# Patient Record
Sex: MALE
[De-identification: names, ages, dates, MRNs, and addresses within clinical notes are randomized per-mention and may not be internally consistent; named-entity substitution may affect disease eponyms.]

---

## 2021-01-13 PROBLEM — Z00.00 ENCOUNTER FOR PREVENTIVE HEALTH EXAMINATION: Status: ACTIVE | Noted: 2021-01-13

## 2021-04-28 ENCOUNTER — APPOINTMENT (OUTPATIENT)
Dept: PULMONOLOGY | Facility: CLINIC | Age: 46
End: 2021-04-28

## 2022-06-21 ENCOUNTER — APPOINTMENT (OUTPATIENT)
Dept: PULMONOLOGY | Facility: CLINIC | Age: 47
End: 2022-06-21
Payer: COMMERCIAL

## 2022-06-21 VITALS
HEIGHT: 67 IN | OXYGEN SATURATION: 95 % | BODY MASS INDEX: 44.1 KG/M2 | WEIGHT: 281 LBS | DIASTOLIC BLOOD PRESSURE: 82 MMHG | TEMPERATURE: 98 F | SYSTOLIC BLOOD PRESSURE: 114 MMHG | HEART RATE: 81 BPM

## 2022-06-21 DIAGNOSIS — R06.00 DYSPNEA, UNSPECIFIED: ICD-10-CM

## 2022-06-21 DIAGNOSIS — Z78.9 OTHER SPECIFIED HEALTH STATUS: ICD-10-CM

## 2022-06-21 DIAGNOSIS — Z87.19 PERSONAL HISTORY OF OTHER DISEASES OF THE DIGESTIVE SYSTEM: ICD-10-CM

## 2022-06-21 DIAGNOSIS — Z86.39 PERSONAL HISTORY OF OTHER ENDOCRINE, NUTRITIONAL AND METABOLIC DISEASE: ICD-10-CM

## 2022-06-21 PROCEDURE — 99203 OFFICE O/P NEW LOW 30 MIN: CPT

## 2022-06-22 PROBLEM — Z87.19 HISTORY OF GASTROESOPHAGEAL REFLUX (GERD): Status: RESOLVED | Noted: 2022-06-22 | Resolved: 2022-06-22

## 2022-06-22 PROBLEM — Z86.39 HISTORY OF TYPE 2 DIABETES MELLITUS: Status: RESOLVED | Noted: 2022-06-22 | Resolved: 2022-06-22

## 2022-06-22 PROBLEM — R06.00 DYSPNEA: Status: ACTIVE | Noted: 2022-06-22

## 2022-06-22 PROBLEM — Z78.9 NON-SMOKER: Status: ACTIVE | Noted: 2022-06-22

## 2022-06-22 RX ORDER — CHOLECALCIFEROL (VITAMIN D3) 1250 MCG
1.25 MG CAPSULE ORAL
Qty: 4 | Refills: 0 | Status: ACTIVE | COMMUNITY
Start: 2021-10-19

## 2022-06-22 RX ORDER — METFORMIN ER 500 MG 500 MG/1
500 TABLET ORAL
Qty: 60 | Refills: 0 | Status: ACTIVE | COMMUNITY
Start: 2022-02-21

## 2022-06-22 RX ORDER — OMEPRAZOLE 40 MG/1
40 CAPSULE, DELAYED RELEASE ORAL
Qty: 30 | Refills: 0 | Status: ACTIVE | COMMUNITY
Start: 2022-04-11

## 2022-06-22 NOTE — DISCUSSION/SUMMARY
[FreeTextEntry1] : 46 yo male with abnormal chest xray. I reviewed the images on line and discussed the findings with the patient and his mother who was present. He is to ha chest CT tomorrow, as ordered by his PMD. I will follow the results. He is to use albuterol MDI PRN  alone for now. PFT will be performed in the future. He is to follow up with his PMD as before.

## 2022-06-22 NOTE — HISTORY OF PRESENT ILLNESS
[Never] : never [TextBox_4] : 46 yo male with hx of abnormal chest xray, presents for evaluation. The study was performed because of dry cough  with PRN "problem breathing". He denies chest pain, night sweats, weight loss or hemoptysis. He was treated ICS/LABA and albuterol MDI in the past. He has GERD, on PPI and has seasonal allergies on PRN OTC treatment. [TextBox_29] : Denies snoring, daytime somnolence, apneic episodes, AM headaches

## 2022-06-22 NOTE — REVIEW OF SYSTEMS
[Cough] : cough [Sputum] : no sputum [Dyspnea] : dyspnea [GERD] : gerd [Diabetes] : diabetes [Negative] : Psychiatric

## 2022-07-12 ENCOUNTER — APPOINTMENT (OUTPATIENT)
Dept: PULMONOLOGY | Facility: CLINIC | Age: 47
End: 2022-07-12

## 2022-07-12 VITALS
HEART RATE: 95 BPM | BODY MASS INDEX: 44.48 KG/M2 | WEIGHT: 284 LBS | DIASTOLIC BLOOD PRESSURE: 78 MMHG | OXYGEN SATURATION: 97 % | TEMPERATURE: 98.2 F | SYSTOLIC BLOOD PRESSURE: 114 MMHG

## 2022-07-12 DIAGNOSIS — J20.9 ACUTE BRONCHITIS, UNSPECIFIED: ICD-10-CM

## 2022-07-12 PROCEDURE — 99214 OFFICE O/P EST MOD 30 MIN: CPT

## 2022-07-12 RX ORDER — PREDNISONE 20 MG/1
20 TABLET ORAL
Qty: 10 | Refills: 0 | Status: ACTIVE | COMMUNITY
Start: 2022-07-12 | End: 1900-01-01

## 2022-07-12 RX ORDER — ALBUTEROL SULFATE 90 UG/1
108 (90 BASE) INHALANT RESPIRATORY (INHALATION)
Qty: 1 | Refills: 3 | Status: ACTIVE | COMMUNITY
Start: 2022-07-12 | End: 1900-01-01

## 2022-07-12 RX ORDER — AZITHROMYCIN 250 MG/1
250 TABLET, FILM COATED ORAL
Qty: 1 | Refills: 0 | Status: ACTIVE | COMMUNITY
Start: 2022-07-12 | End: 1900-01-01

## 2022-07-28 PROBLEM — J20.9 ACUTE BRONCHITIS, UNSPECIFIED ORGANISM: Status: RESOLVED | Noted: 2022-07-28 | Resolved: 2022-08-27

## 2022-07-28 NOTE — HISTORY OF PRESENT ILLNESS
[Never] : never [TextBox_4] : 48 yo male with hx of abnormal chest xray presents for follow up of recent chest CT results. The patient complains of a one week history of productive cough without fever or chills. He uses incruse alone. [TextBox_29] : Denies snoring, daytime somnolence, apneic episodes, AM headaches

## 2022-07-28 NOTE — DISCUSSION/SUMMARY
[FreeTextEntry1] : 46 yo male with normal chest CT findings. I reviewed the images on line and discussed the findings with the patient. Given recent cough he was prescribed prednisone 40 mg daily for five days with zpack and PRN albuterol MDI. He is to stop use of incruse. PFT will be performed in the future. He is to follow up with his PMD as before.

## 2022-07-28 NOTE — REVIEW OF SYSTEMS
[Cough] : cough [Sputum] : sputum [Dyspnea] : no dyspnea [GERD] : gerd [Diabetes] : diabetes [Negative] : Psychiatric

## 2022-08-09 ENCOUNTER — APPOINTMENT (OUTPATIENT)
Dept: PULMONOLOGY | Facility: CLINIC | Age: 47
End: 2022-08-09

## 2022-08-09 VITALS
HEART RATE: 96 BPM | DIASTOLIC BLOOD PRESSURE: 74 MMHG | SYSTOLIC BLOOD PRESSURE: 110 MMHG | WEIGHT: 285 LBS | BODY MASS INDEX: 44.64 KG/M2 | OXYGEN SATURATION: 96 % | TEMPERATURE: 97.8 F

## 2022-08-09 DIAGNOSIS — R93.89 ABNORMAL FINDINGS ON DIAGNOSTIC IMAGING OF OTHER SPECIFIED BODY STRUCTURES: ICD-10-CM

## 2022-08-09 DIAGNOSIS — R05.9 COUGH, UNSPECIFIED: ICD-10-CM

## 2022-08-09 PROCEDURE — 94729 DIFFUSING CAPACITY: CPT

## 2022-08-09 PROCEDURE — 94727 GAS DIL/WSHOT DETER LNG VOL: CPT

## 2022-08-09 PROCEDURE — 94060 EVALUATION OF WHEEZING: CPT

## 2022-08-09 PROCEDURE — 99214 OFFICE O/P EST MOD 30 MIN: CPT | Mod: 25

## 2022-08-09 RX ORDER — ALBUTEROL SULFATE 2.5 MG/3ML
(2.5 MG/3ML) SOLUTION RESPIRATORY (INHALATION)
Qty: 3 | Refills: 3 | Status: ACTIVE | COMMUNITY
Start: 2022-08-09 | End: 1900-01-01

## 2022-08-09 RX ORDER — FLUTICASONE FUROATE AND VILANTEROL TRIFENATATE 200; 25 UG/1; UG/1
200-25 POWDER RESPIRATORY (INHALATION)
Qty: 1 | Refills: 3 | Status: ACTIVE | COMMUNITY
Start: 2022-08-09 | End: 1900-01-01

## 2022-08-09 RX ORDER — ALBUTEROL SULFATE 90 UG/1
108 (90 BASE) INHALANT RESPIRATORY (INHALATION)
Qty: 1 | Refills: 1 | Status: ACTIVE | COMMUNITY
Start: 2022-08-09 | End: 1900-01-01

## 2022-08-10 ENCOUNTER — NON-APPOINTMENT (OUTPATIENT)
Age: 47
End: 2022-08-10

## 2022-08-14 PROBLEM — R05.9 COUGH: Status: ACTIVE | Noted: 2022-06-22

## 2022-08-14 PROBLEM — R93.89 ABNORMAL CHEST X-RAY: Status: ACTIVE | Noted: 2022-06-22

## 2022-08-14 NOTE — HISTORY OF PRESENT ILLNESS
[Never] : never [TextBox_4] : 48 yo male with hx of PRN productive cough presents for follow up. The patient continues to have a productive cough without SOB, chest pain or hemoptysis. He complains of a "tickle in throat". He uses nebulized albuterol PRN.A chest CT was performed since last visit. [TextBox_29] : Denies snoring, daytime somnolence, apneic episodes, AM headaches

## 2022-08-14 NOTE — DISCUSSION/SUMMARY
[FreeTextEntry1] : 46 yo male with chronic cough. I reviewed the PFT results with the patient.Given the bronchodilator response, breo prescribed with continued albuterol MDI use. Treatment adjustment will depend on symptomatic needs.I also reviewed the chest CT images performed last month on line and discussed the findings with the patient. No acute chest pathology was noted.He is to follow up with his PMD as before.

## 2022-10-11 ENCOUNTER — APPOINTMENT (OUTPATIENT)
Dept: PULMONOLOGY | Facility: CLINIC | Age: 47
End: 2022-10-11